# Patient Record
Sex: FEMALE | Race: WHITE
[De-identification: names, ages, dates, MRNs, and addresses within clinical notes are randomized per-mention and may not be internally consistent; named-entity substitution may affect disease eponyms.]

---

## 2017-11-20 ENCOUNTER — RX ONLY (OUTPATIENT)
Age: 52
Setting detail: RX ONLY
End: 2017-11-20

## 2024-04-08 ENCOUNTER — LAB (OUTPATIENT)
Dept: URBAN - METROPOLITAN AREA CLINIC 12 | Facility: CLINIC | Age: 59
End: 2024-04-08

## 2024-04-08 ENCOUNTER — OV NP (OUTPATIENT)
Dept: URBAN - METROPOLITAN AREA CLINIC 12 | Facility: CLINIC | Age: 59
End: 2024-04-08
Payer: COMMERCIAL

## 2024-04-08 VITALS
WEIGHT: 193.8 LBS | BODY MASS INDEX: 31.15 KG/M2 | HEART RATE: 75 BPM | HEIGHT: 66 IN | SYSTOLIC BLOOD PRESSURE: 120 MMHG | DIASTOLIC BLOOD PRESSURE: 86 MMHG | TEMPERATURE: 97.5 F

## 2024-04-08 DIAGNOSIS — R10.11 RIGHT UPPER QUADRANT ABDOMINAL PAIN: ICD-10-CM

## 2024-04-08 DIAGNOSIS — Z86.010 PERSONAL HISTORY OF COLONIC POLYPS: ICD-10-CM

## 2024-04-08 DIAGNOSIS — Z86.79 HISTORY OF SUPRAVENTRICULAR TACHYCARDIA: ICD-10-CM

## 2024-04-08 PROCEDURE — 99244 OFF/OP CNSLTJ NEW/EST MOD 40: CPT | Performed by: STUDENT IN AN ORGANIZED HEALTH CARE EDUCATION/TRAINING PROGRAM

## 2024-04-08 RX ORDER — OMEPRAZOLE 40 MG/1
TAKE 1 CAPSULE BY ORAL ROUTE QD FOR 3 WEEKS CAPSULE, DELAYED RELEASE PELLETS ORAL 1
Qty: 21 | Refills: 0 | Status: ON HOLD | COMMUNITY
Start: 2017-05-02

## 2024-04-08 NOTE — HPI-TODAY'S VISIT:
This patient was referred by Dr. Anna for evaluation for colonoscopy . A copy of this note will be sent to the referring provider. 59 yo F here for eval prior to colonoscopy. Last colonoscopy 2017 with Dr. Zuniga -- 1 small TA.  She says she has been having some abdominal discomfort, mid abdominal pain. Sometimes RLQ and sometimes RUQ She had an abdominal ultrasound, says she did not have gallstones. No report available for review at this time. Maternal aunt had GB cancer at age 61. She has a h/o SVT -- says she will go up to 180 and then will calm down to normal.  SHe has seen a cardiologist for this.

## 2024-04-29 PROBLEM — 428283002: Status: ACTIVE | Noted: 2024-04-08

## 2024-09-11 ENCOUNTER — LAB OUTSIDE AN ENCOUNTER (OUTPATIENT)
Dept: URBAN - METROPOLITAN AREA CLINIC 12 | Facility: CLINIC | Age: 59
End: 2024-09-11

## 2024-09-11 ENCOUNTER — DASHBOARD ENCOUNTERS (OUTPATIENT)
Age: 59
End: 2024-09-11

## 2024-09-11 ENCOUNTER — OFFICE VISIT (OUTPATIENT)
Dept: URBAN - METROPOLITAN AREA CLINIC 12 | Facility: CLINIC | Age: 59
End: 2024-09-11
Payer: COMMERCIAL

## 2024-09-11 VITALS
TEMPERATURE: 97.3 F | DIASTOLIC BLOOD PRESSURE: 85 MMHG | WEIGHT: 198 LBS | HEART RATE: 69 BPM | HEIGHT: 66 IN | BODY MASS INDEX: 31.82 KG/M2 | SYSTOLIC BLOOD PRESSURE: 130 MMHG

## 2024-09-11 DIAGNOSIS — R10.11 RIGHT UPPER QUADRANT PAIN: ICD-10-CM

## 2024-09-11 DIAGNOSIS — Z86.010 PERSONAL HISTORY OF COLONIC POLYPS: ICD-10-CM

## 2024-09-11 DIAGNOSIS — R11.0 NAUSEA: ICD-10-CM

## 2024-09-11 DIAGNOSIS — R13.19 ESOPHAGEAL DYSPHAGIA: ICD-10-CM

## 2024-09-11 DIAGNOSIS — K30 INDIGESTION: ICD-10-CM

## 2024-09-11 PROBLEM — 162031009: Status: ACTIVE | Noted: 2024-09-11

## 2024-09-11 PROCEDURE — 99214 OFFICE O/P EST MOD 30 MIN: CPT | Performed by: STUDENT IN AN ORGANIZED HEALTH CARE EDUCATION/TRAINING PROGRAM

## 2024-09-11 RX ORDER — PANTOPRAZOLE SODIUM 40 MG/1
1 TABLET TABLET, DELAYED RELEASE ORAL ONCE A DAY
Qty: 30 | Refills: 3 | OUTPATIENT
Start: 2024-09-11

## 2024-09-11 RX ORDER — ONDANSETRON 4 MG/1
1 TABLET ON THE TONGUE AND ALLOW TO DISSOLVE TABLET, ORALLY DISINTEGRATING ORAL
Qty: 30 TABLET | Refills: 0 | OUTPATIENT
Start: 2024-09-11

## 2024-09-11 NOTE — HPI-TODAY'S VISIT:
59 yo F here for follow up. Recently the last few days has had R sided abdominal discomfort.  Symptoms started Sunday night then continued M and Tues. Ate chicken wings and onion rings prior to it started.  Says she had outrageous burping, as well as nausea. Then after burping the nausea would improve.  One episode of diarrhea, otherwise BM at baseline.  She has chronic acid reflux, not on medications. Says she has a h/o Schatzki ring. Has had difficulty swallowing lately and reports a h/o esophageal dilation 7-8 years ago.  Is interested in getting an EGD at the same time as her colonoscopy Has not been on long term PPI.

## 2024-09-19 LAB — H PYLORI BREATH TEST: NOT DETECTED

## 2024-09-27 ENCOUNTER — OFFICE VISIT (OUTPATIENT)
Dept: URBAN - METROPOLITAN AREA SURGERY CENTER 15 | Facility: SURGERY CENTER | Age: 59
End: 2024-09-27

## 2024-09-27 RX ORDER — PANTOPRAZOLE SODIUM 40 MG/1
1 TABLET TABLET, DELAYED RELEASE ORAL ONCE A DAY
Qty: 30 | Refills: 3 | Status: ACTIVE | COMMUNITY
Start: 2024-09-11

## 2024-09-27 RX ORDER — ONDANSETRON 4 MG/1
1 TABLET ON THE TONGUE AND ALLOW TO DISSOLVE TABLET, ORALLY DISINTEGRATING ORAL
Qty: 30 TABLET | Refills: 0 | Status: ACTIVE | COMMUNITY
Start: 2024-09-11

## 2024-10-11 ENCOUNTER — OFFICE VISIT (OUTPATIENT)
Dept: URBAN - METROPOLITAN AREA CLINIC 12 | Facility: CLINIC | Age: 59
End: 2024-10-11
Payer: COMMERCIAL

## 2024-10-11 VITALS
DIASTOLIC BLOOD PRESSURE: 87 MMHG | HEART RATE: 73 BPM | HEIGHT: 66 IN | SYSTOLIC BLOOD PRESSURE: 125 MMHG | BODY MASS INDEX: 32.75 KG/M2 | TEMPERATURE: 97.3 F | WEIGHT: 203.8 LBS

## 2024-10-11 DIAGNOSIS — R10.31 RIGHT LOWER QUADRANT PAIN: ICD-10-CM

## 2024-10-11 DIAGNOSIS — Z87.19 HISTORY OF GASTROESOPHAGEAL REFLUX (GERD): ICD-10-CM

## 2024-10-11 DIAGNOSIS — R10.11 RIGHT UPPER QUADRANT PAIN: ICD-10-CM

## 2024-10-11 DIAGNOSIS — Z86.0100 HISTORY OF COLON POLYPS: ICD-10-CM

## 2024-10-11 DIAGNOSIS — R10.32 LEFT LOWER QUADRANT PAIN: ICD-10-CM

## 2024-10-11 DIAGNOSIS — R10.84 DIFFUSE ABDOMINAL PAIN: ICD-10-CM

## 2024-10-11 DIAGNOSIS — K57.30 ACQUIRED DIVERTICULOSIS OF COLON: ICD-10-CM

## 2024-10-11 PROBLEM — 397881000: Status: ACTIVE | Noted: 2024-10-11

## 2024-10-11 PROBLEM — 70861000119106: Status: ACTIVE | Noted: 2024-10-11

## 2024-10-11 PROBLEM — 428283002: Status: ACTIVE | Noted: 2024-10-11

## 2024-10-11 PROBLEM — 102614006: Status: ACTIVE | Noted: 2024-10-11

## 2024-10-11 PROBLEM — 43364001: Status: ACTIVE | Noted: 2024-10-11

## 2024-10-11 PROBLEM — 301716002: Status: ACTIVE | Noted: 2024-10-11

## 2024-10-11 PROCEDURE — 99214 OFFICE O/P EST MOD 30 MIN: CPT

## 2024-10-11 NOTE — HPI-TODAY'S VISIT:
Patient presents today for a follow up after recent egd/colon, HIDA scan and CT a/p. She was seen by Dr. Hopper on 9/11/24 for dysphagia, nausea, RUQ pain and diffusee abd pain/discomfort.   Colonoscopy: 1 hyperplastic polyp diverticulosis in the sigmoid colon. Repeat in 5 years advised.  EGD: unremarkable. Bx showed mild chronic inactive gastritis. Negative for HP.  HIDA scan: unremarkable.  CT: cholelithiasis without cholecystitis. Subcentimeter hypodensities in the right kidney and liver, too small to further characterize.  Pt reports persistent mild diffuse abdominal pain and discomfort. Reports infrequent acid reflux and heartburn. Pt has an appt scheduled with her gyn for further eval on low abd pain and has plan to see PT for back pain.